# Patient Record
Sex: FEMALE | Race: AMERICAN INDIAN OR ALASKA NATIVE
[De-identification: names, ages, dates, MRNs, and addresses within clinical notes are randomized per-mention and may not be internally consistent; named-entity substitution may affect disease eponyms.]

---

## 2019-09-27 ENCOUNTER — HOSPITAL ENCOUNTER (EMERGENCY)
Dept: HOSPITAL 5 - ED | Age: 29
LOS: 4 days | Discharge: PSYCHIATRIC OR SUBSTANCE USE FACILITY | End: 2019-10-01
Payer: MEDICAID

## 2019-09-27 DIAGNOSIS — F22: ICD-10-CM

## 2019-09-27 DIAGNOSIS — F31.9: ICD-10-CM

## 2019-09-27 DIAGNOSIS — F23: Primary | ICD-10-CM

## 2019-09-27 LAB
BACTERIA #/AREA URNS HPF: (no result) /HPF
BASOPHILS # (AUTO): 0.1 K/MM3 (ref 0–0.1)
BASOPHILS NFR BLD AUTO: 0.9 % (ref 0–1.8)
BENZODIAZEPINES SCREEN,URINE: (no result)
BILIRUB UR QL STRIP: (no result)
BLOOD UR QL VISUAL: (no result)
BUN SERPL-MCNC: 5 MG/DL (ref 7–17)
BUN/CREAT SERPL: 7 %
CALCIUM SERPL-MCNC: 9.3 MG/DL (ref 8.4–10.2)
EOSINOPHIL # BLD AUTO: 0.1 K/MM3 (ref 0–0.4)
EOSINOPHIL NFR BLD AUTO: 0.7 % (ref 0–4.3)
HCT VFR BLD CALC: 36 % (ref 30.3–42.9)
HEMOLYSIS INDEX: 3
HGB BLD-MCNC: 12.1 GM/DL (ref 10.1–14.3)
LYMPHOCYTES # BLD AUTO: 2.6 K/MM3 (ref 1.2–5.4)
LYMPHOCYTES NFR BLD AUTO: 25.3 % (ref 13.4–35)
MCHC RBC AUTO-ENTMCNC: 34 % (ref 30–34)
MCV RBC AUTO: 84 FL (ref 79–97)
METHADONE SCREEN,URINE: (no result)
MONOCYTES # (AUTO): 1.2 K/MM3 (ref 0–0.8)
MONOCYTES % (AUTO): 11.5 % (ref 0–7.3)
MUCOUS THREADS #/AREA URNS HPF: (no result) /HPF
OPIATE SCREEN,URINE: (no result)
PH UR STRIP: 6 [PH] (ref 5–7)
PLATELET # BLD: 312 K/MM3 (ref 140–440)
RBC # BLD AUTO: 4.27 M/MM3 (ref 3.65–5.03)
RBC #/AREA URNS HPF: 5 /HPF (ref 0–6)
UROBILINOGEN UR-MCNC: 2 MG/DL (ref ?–2)
WBC #/AREA URNS HPF: 2 /HPF (ref 0–6)

## 2019-09-27 PROCEDURE — 36415 COLL VENOUS BLD VENIPUNCTURE: CPT

## 2019-09-27 PROCEDURE — 84703 CHORIONIC GONADOTROPIN ASSAY: CPT

## 2019-09-27 PROCEDURE — 80178 ASSAY OF LITHIUM: CPT

## 2019-09-27 PROCEDURE — 80048 BASIC METABOLIC PNL TOTAL CA: CPT

## 2019-09-27 PROCEDURE — G0480 DRUG TEST DEF 1-7 CLASSES: HCPCS

## 2019-09-27 PROCEDURE — 81001 URINALYSIS AUTO W/SCOPE: CPT

## 2019-09-27 PROCEDURE — 85025 COMPLETE CBC W/AUTO DIFF WBC: CPT

## 2019-09-27 PROCEDURE — 80320 DRUG SCREEN QUANTALCOHOLS: CPT

## 2019-09-27 PROCEDURE — 80307 DRUG TEST PRSMV CHEM ANLYZR: CPT

## 2019-09-27 RX ADMIN — LITHIUM CARBONATE SCH MG: 300 CAPSULE, GELATIN COATED ORAL at 22:29

## 2019-09-27 RX ADMIN — LITHIUM CARBONATE SCH MG: 300 CAPSULE, GELATIN COATED ORAL at 14:00

## 2019-09-27 NOTE — EMERGENCY DEPARTMENT REPORT
HPI





<BRANDSUE - Last Filed: 09/30/19 21:53>





- HPI


HPI: 


29-year-old -American female presents to the emergency department via 

[facility] after they were allegedly called out for this patient exhibiting

some bizarre behavior.  Patient appears to have a history of bipolar disorder 

and allegedly has been off of her Risperdal and lithium for about one year after

being pregnant.  The patient is not forthcoming regarding the events that 

occurred prior to arrival and the reasons why she was brought in by EMS.  She 

admits that the person who called EMS is someone that she knows and it sounds 

like she may have come from home.  Her main complaints to me at this time are 

that "I am cold" and "I am tired because I am not getting sleep."  There is some

triage information that also says that she has not slept the past 2-3 days and 

that she needs a break from her kids.  I asked the patient if she has any 

auditory or visual hallucinations and she just responds with "tomb."








<ANABELLA JORDAN S - Last Filed: 10/04/19 08:21>





- General


Chief Complaint: Psych


Time Seen by Provider: 09/27/19 06:03





ED Past Medical Hx





<SUE BRAND - Last Filed: 09/30/19 21:53>





- Past Medical History


Previous Medical History?: Yes


Hx Psychiatric Treatment: Yes (Bipolar)





- Surgical History


Past Surgical History?: No





- Social History


Smoking Status: Unknown if ever smoked


Substance Use Type: None





<ANABELLA JORDAN S - Last Filed: 10/04/19 08:21>





- Medications


Home Medications: 


                                Home Medications











 Medication  Instructions  Recorded  Confirmed  Last Taken  Type


 


No Known Home Medications [No  09/27/19 09/27/19 Unknown History





Reported Home Medications]     














ED Review of Systems


ROS: 


Stated complaint: MH EVAL


Other details as noted in HPI








<SUE BRAND - Last Filed: 09/30/19 21:53>


ROS: 


Stated complaint: MH EVAL


Other details as noted in HPI





Comment: Unobtainable due to pts medical conditions





<ANABELLA JORDAN S - Last Filed: 10/04/19 08:21>





Physical Exam





- Physical Exam


Vital Signs: 


                                   Vital Signs











  09/27/19 09/27/19 09/27/19





  04:09 13:00 19:53


 


Temperature 97.7 F 98.1 F 98.0 F


 


Pulse Rate 69 66 64


 


Respiratory 20 18 18





Rate   


 


Blood Pressure 120/85 122/88 110/65





[Right]   


 


O2 Sat by Pulse 100 97 100





Oximetry   














  09/28/19 09/28/19 09/28/19





  02:29 08:32 16:50


 


Temperature 97.4 F L 97.8 F 97.5 F L


 


Pulse Rate 56 L 68 68


 


Respiratory 16 18 18





Rate   


 


Blood Pressure 111/73 117/85 120/81





[Right]   


 


O2 Sat by Pulse 100 100 100





Oximetry   














  09/28/19 09/29/19 09/29/19





  19:30 01:00 07:00


 


Temperature 97.3 F L 97.8 F 98.0 F


 


Pulse Rate 71 51 L 74


 


Respiratory 18 18 18





Rate   


 


Blood Pressure 115/82 101/63 124/82





[Right]   


 


O2 Sat by Pulse 100 100 100





Oximetry   














  09/29/19 09/29/19 09/30/19





  14:00 20:40 01:00


 


Temperature 98.7 F 97.2 F L 97.8 F


 


Pulse Rate 64 78 86


 


Respiratory 18 18 16





Rate   


 


Blood Pressure 120/70 106/70 99/70





[Right]   


 


O2 Sat by Pulse 99 100 100





Oximetry   














  09/30/19 09/30/19





  08:00 14:47


 


Temperature 97.3 F L 97.8 F


 


Pulse Rate 89 74


 


Respiratory 18 20





Rate  


 


Blood Pressure 115/74 110/74





[Right]  


 


O2 Sat by Pulse 99 100





Oximetry  














<SUE BRAND - Last Filed: 09/30/19 21:53>





- Physical Exam


Vital Signs: 


                                   Vital Signs











  09/27/19





  04:09


 


Temperature 97.7 F


 


Pulse Rate 69


 


Respiratory 20





Rate 


 


Blood Pressure 120/85





[Right] 


 


O2 Sat by Pulse 100





Oximetry 











Physical Exam: 





GENERAL: The patient is well-developed well-nourished.


HENT: Normocephalic.  Atraumatic.    Patient has moist mucous membranes.


EYES: Extraocular motions are intact.  


NECK: Supple. Trachea is midline.


CHEST/LUNGS: Clear to auscultation.  There is no respiratory distress noted.


HEART/CARDIOVASCULAR: Regular.  There is no tachycardia.  There is no murmur.


ABDOMEN: Abdomen is soft, nontender.  Patient has normal bowel sounds.  There is

 no abdominal distention.


SKIN: Skin is warm and dry.


NEURO: The patient is awake, alert, and oriented.  The patient is cooperative.  

The patient has no focal neurologic deficits.  Normal speech.


MUSCULOSKELETAL: There is no tenderness or deformity.   There is no evidence of 

acute injury.





<ANABELLA JORDAN - Last Filed: 10/04/19 08:21>





ED Course


                                   Vital Signs











  09/27/19 09/27/19 09/27/19





  04:09 13:00 19:53


 


Temperature 97.7 F 98.1 F 98.0 F


 


Pulse Rate 69 66 64


 


Respiratory 20 18 18





Rate   


 


Blood Pressure 120/85 122/88 110/65





[Right]   


 


O2 Sat by Pulse 100 97 100





Oximetry   














  09/28/19 09/28/19 09/28/19





  02:29 08:32 16:50


 


Temperature 97.4 F L 97.8 F 97.5 F L


 


Pulse Rate 56 L 68 68


 


Respiratory 16 18 18





Rate   


 


Blood Pressure 111/73 117/85 120/81





[Right]   


 


O2 Sat by Pulse 100 100 100





Oximetry   














  09/28/19 09/29/19 09/29/19





  19:30 01:00 07:00


 


Temperature 97.3 F L 97.8 F 98.0 F


 


Pulse Rate 71 51 L 74


 


Respiratory 18 18 18





Rate   


 


Blood Pressure 115/82 101/63 124/82





[Right]   


 


O2 Sat by Pulse 100 100 100





Oximetry   














  09/29/19 09/29/19 09/30/19





  14:00 20:40 01:00


 


Temperature 98.7 F 97.2 F L 97.8 F


 


Pulse Rate 64 78 86


 


Respiratory 18 18 16





Rate   


 


Blood Pressure 120/70 106/70 99/70





[Right]   


 


O2 Sat by Pulse 99 100 100





Oximetry   














  09/30/19 09/30/19





  08:00 14:47


 


Temperature 97.3 F L 97.8 F


 


Pulse Rate 89 74


 


Respiratory 18 20





Rate  


 


Blood Pressure 115/74 110/74





[Right]  


 


O2 Sat by Pulse 99 100





Oximetry  














- Reevaluation(s)


Reevaluation #1: 





09/30/19 21:53


Patient has been accepted to [facility] at this time for continued mental 

health care





<SUE BRAND - Last Filed: 09/30/19 21:53>


                                   Vital Signs











  09/27/19





  04:09


 


Temperature 97.7 F


 


Pulse Rate 69


 


Respiratory 20





Rate 


 


Blood Pressure 120/85





[Right] 


 


O2 Sat by Pulse 100





Oximetry 














<ANABELLA JORDNA - Last Filed: 10/04/19 08:21>





ED Medical Decision Making





- Lab Data


Result diagrams: 


                                 09/27/19 05:22





                                 09/27/19 05:22





<SUE BRAND - Last Filed: 09/30/19 21:53>





- Lab Data


Result diagrams: 


                                 09/27/19 05:22





                                 09/27/19 05:22





- Medical Decision Making


This patient presents to the emergency department for a mental health 

evaluation.  For me, she is not very forthcoming regarding the prehospital 

history or regarding her current state of mind.  However, the patient was seen 

by the psych , Spencer, and began telling him about her hallucinations and 

what appears to be paranoia and delusions.  For this reason, the patient does 

appear to be having some acute psychosis and meets criteria to be made a 1013.  

The labs have been unremarkable.  Vital signs stable throughout her ED course.  

Patient is medically cleared for psychiatric placement.








- Differential Diagnosis


bipolar disorder, schizophrenia, schizoaffective





<ANABELLA JORDAN - Last Filed: 10/04/19 08:21>


Critical care attestation.: 


If time is entered above; I have spent that time in minutes in the direct care 

of this critically ill patient, excluding procedure time.








<SUE BRAND - Last Filed: 09/30/19 21:53>


Critical Care Time: No


Critical care attestation.: 


If time is entered above; I have spent that time in minutes in the direct care 

of this critically ill patient, excluding procedure time.








<ANABELLA JORDAN - Last Filed: 10/04/19 08:21>





ED Disposition


Is pt being admited?: No


Does the pt Need Aspirin: No





<SUE BRAND - Last Filed: 09/30/19 21:53>


Is pt being admited?: No


Time of Disposition: 14:51





<ANABELLA JORDAN - Last Filed: 10/04/19 08:21>


Clinical Impression: 


 Acute psychosis, Paranoia, Delusions





Disposition: DC/[place]-[facility] PSY HOSP/PSY UNIT


Condition: Stable


Referrals: 


PRIMARY CARE,MD [Primary Care Provider] - 3-5 Days

## 2019-09-27 NOTE — CONSULTATION
History of Present Illness





- Reason for Consult


Consult date: 09/27/19


Reason for consult: Mental Health Evaluation


Requesting physician: ANABELLA JORDAN





- Chief Complaint


Chief complaint: 


"It's about Yahweh"








- History of Present Psychiatric Illness


29 y.o. AA female who presented to the ER for bizarre behavior. Today the 

patient was manic, but cooperative during the assessment. She was hyper 

Gnosticist throughout the interview. She was observed holding a huge bible close 

to herself. She stated that she came to the ER because she was advised by her 

spiritual family to seek help. She believe that women and children are being 

abducted at her apt complex. She stated that she have not been on her psy me

dications (Risperdal/Lithium) for years because she was pregnant. She stated 

that she haven't sleep in several days because "Carlos" said not to sleep. She 

is adamant that "Carlos" is her name as well. She denies SI/HI's and AVH's. She 

denies a poor appetite. She denies recreational drug use, but was positive for 

marijuana. She denies alcohol consumption (etoh).  





At this time, the patient's legal name is unknown.





Medications and Allergies


                                    Allergies











Allergy/AdvReac Type Severity Reaction Status Date / Time


 


No Known Allergies Allergy   Unverified 09/27/19 04:09











                                Home Medications











 Medication  Instructions  Recorded  Confirmed  Last Taken  Type


 


No Known Home Medications [No  09/27/19 09/27/19 Unknown History





Reported Home Medications]     














Past psychiatric history





- Past Medical History


Past Medical History: other (Pregnant x 3)


Past Surgical History: No surgical history





- past Psychiatric treatment and history


psychiatric treatment history: 


Hx of Bipolar DO. Denies a fam psy hx.








- Social History


Social history: lives with family





Mental Status Exam





- Vital signs


                                Last Vital Signs











Temp  97.7 F   09/27/19 04:09


 


Pulse  69   09/27/19 04:09


 


Resp  20   09/27/19 04:09


 


BP  120/85   09/27/19 04:09


 


Pulse Ox  100   09/27/19 04:09














- Exam


Narrative exam: 


MSE:





 Appearance: bed sheet wrapped around the patient's head 


 Behavior: regular eye contact


 Speech: regular rate and tone


 Mood: "okay"


 Affect: congruent to mood 


 Thought Process: circumstantial          


 Thought Content: denies SI/HI's and VH's, delusional, hyper Gnosticist     


 Motor Activity: sitting up in bed


 Cognition: A/O x 3


 Insight: variable 


 Judgment: poor   























Results


Result Diagrams: 


                                 09/27/19 05:22





                                 09/27/19 05:22


                              Abnormal lab results











  09/27/19 09/27/19 09/27/19 Range/Units





  05:22 05:22 05:22 


 


Mono % (Auto)     (0.0-7.3)  %


 


Mono #     (0.0-0.8)  K/mm3


 


BUN    5 L  (7-17)  mg/dL


 


Salicylates  < 0.3 L    (2.8-20.0)  mg/dL


 


Acetaminophen   < 5.0 L   (10.0-30.0)  ug/mL














  09/27/19 Range/Units





  05:22 


 


Mono % (Auto)  11.5 H  (0.0-7.3)  %


 


Mono #  1.2 H  (0.0-0.8)  K/mm3


 


BUN   (7-17)  mg/dL


 


Salicylates   (2.8-20.0)  mg/dL


 


Acetaminophen   (10.0-30.0)  ug/mL








All other labs normal.








Assessment and Plan


Assessment and plan: 


Impression: Bipolar DO with psychosis. Cannabis Use DO. The patient is manic. 

Today the patient was cooperative during the assessment. 





DDx: SCAD, Schizophrenia





Recommendation/Plan: Continue 1013 and start Risperdal 1 mg PO HS for 

psychosis/mood and Lithium 300 mg PO BID for mood. Discussed possible metabolic 

side effects of Risperdal with the patient, she verbalized understanding.  





Dispo: The patient was referred to inpatient psy services.   





Staffed with Dr NEIL Fernández.

## 2019-09-28 RX ADMIN — LITHIUM CARBONATE SCH MG: 300 CAPSULE, GELATIN COATED ORAL at 21:52

## 2019-09-28 RX ADMIN — LITHIUM CARBONATE SCH MG: 300 CAPSULE, GELATIN COATED ORAL at 11:00

## 2019-09-28 NOTE — EMERGENCY DEPARTMENT REPORT
<TEJEDABILL M - Last Filed: 10/02/19 23:46>





ED General Adult HPI





- General


Chief complaint: Psych


Stated complaint: MH EVAL


Time Seen by Provider: 09/27/19 06:03





- Related Data


                                Home Medications











 Medication  Instructions  Recorded  Confirmed  Last Taken


 


No Known Home Medications [No  09/27/19 09/27/19 Unknown





Reported Home Medications]    











                                    Allergies











Allergy/AdvReac Type Severity Reaction Status Date / Time


 


No Known Allergies Allergy   Unverified 09/27/19 04:09














ED Past Medical Hx





- Medications


Home Medications: 


                                Home Medications











 Medication  Instructions  Recorded  Confirmed  Last Taken  Type


 


No Known Home Medications [No  09/27/19 09/27/19 Unknown History





Reported Home Medications]     














ED Medical Decision Making





- Lab Data


Result diagrams: 


                                 09/27/19 05:22





                                 09/27/19 05:22





ED Disposition


Clinical Impression: 


 Paranoia, Delusions





Disposition: DC/[facility] PSY HOSP/PSY UNIT


Condition: Stable


Referrals: 


PRIMARY CARE,MD [Primary Care Provider] - 3-5 Days





<NAPOLEON CAMACHO - Last Filed: 11/23/19 12:34>





ED General Adult HPI





- General


Source: patient, EMS


Mode of arrival: Stretcher


Limitations: No Limitations





- History of Present Illness


Initial comments: 





Chief complaint: 


"It's about Yahweh"








- History of Present Psychiatric Illness


29 y.o. AA female who presented to the ER for bizarre behavior. Today Patient 

shows no significant changes, a bit more calmer still manic, but cooperative 

during the assessment. She is hyper Faith throughout the interview. [employer] 

is her Mosque and she is yet to divulge her real name or significant details 

of existance. She holds the teachings of Carlos in her lap as we talk. She 

stated that she came to the ER because she was advised by her spiritual family 

to seek help. She believe that women and children are being abducted at her apt 

complex. She stated that she have not been on her psy medications 

(Risperdal/Lithium) for year because she was pregnant. She stated that she 

haven't sleep in several days because "Yalutherjustina" said not to sleep.  She denies 

SI/HI's and AVH's. She denies a poor appetite. She denies recreational drug use,

but she was positive for marijuana. She denies alcohol consumption (etoh).  





At this time, the patient's legal name is unknown.





                                    Allergies











Allergy/AdvReac Type Severity Reaction Status Date / Time


 


No Known Allergies Allergy   Unverified 09/27/19 04:09




















- Past Medical History


Past Medical History: other (Pregnant x 3)


Past Surgical History: No surgical history





- past Psychiatric treatment and history


psychiatric treatment history: 


Hx of Bipolar DO. Denies a fam psy hx.








- Social History


Social history: lives with family








- Vital signs


                                Last Vital Signs











Temp  97.7 F   09/27/19 04:09


 


Pulse  69   09/27/19 04:09


 


Resp  20   09/27/19 04:09


 


BP  120/85   09/27/19 04:09


 


Pulse Ox  100   09/27/19 04:09














- Exam


Narrative exam: 


MSE:





 Appearance: bed sheet wrapped around the patient's head 


 Behavior: regular eye contact


 Speech: regular rate and tone


 Mood: "okay"


 Affect: congruent to mood 


 Thought Process: circumstantial          


 Thought Content: denies SI/HI's and VH's, delusional, hyper Faith     


 Motor Activity: sitting up in bed


 Cognition: A/O x 3


 Insight: variable 


 Judgment: poor   























 Results 


Result Diagrams: 


                                 09/27/19 05:22





                                 09/27/19 05:22


                              Abnormal lab results











  09/27/19 09/27/19 09/27/19 Range/Units





  05:22 05:22 05:22 


 


Mono % (Auto)     (0.0-7.3)  %


 


Mono #     (0.0-0.8)  K/mm3


 


BUN    5 L  (7-17)  mg/dL


 


Salicylates  < 0.3 L    (2.8-20.0)  mg/dL


 


Acetaminophen   < 5.0 L   (10.0-30.0)  ug/mL














  09/27/19 Range/Units





  05:22 


 


Mono % (Auto)  11.5 H  (0.0-7.3)  %


 


Mono #  1.2 H  (0.0-0.8)  K/mm3


 


BUN   (7-17)  mg/dL


 


Salicylates   (2.8-20.0)  mg/dL


 


Acetaminophen   (10.0-30.0)  ug/mL








All other labs normal.











Assessment and plan: 


Impression: Bipolar DO with psychosis. Cannabis Use DO. The patient is manic. 

Today the patient was cooperative during the assessment. 





DDx: SCAD, Schizophrenia





Recommendation/Plan: Continue 1013 and start Risperdal 1 mg PO HS for 

psychosis/mood and Lithium 300 mg PO BID for mood. Discussed possible metabolic 

side effects of Risperdal with the patient, she verbalized understanding.  





Dispo: The patient was referred to inpatient psy services





Staffe with Dr NEIL Tejeda. 

















Severity scale (0 -10): 0





ED Review of Systems


ROS: 


Stated complaint: MH EVAL


Other details as noted in HPI





Comment: All other systems reviewed and negative





ED Past Medical Hx





- Past Medical History


Previous Medical History?: Yes


Hx Psychiatric Treatment: Yes (Bipolar)





- Surgical History


Past Surgical History?: No





- Social History


Smoking Status: Unknown if ever smoked


Substance Use Type: None





ED Physical Exam





- General


Limitations: No Limitations


General appearance: alert, in no apparent distress





- Head


Head exam: Present: atraumatic, normocephalic





- Eye


Eye exam: Present: normal appearance





- Neck


Neck exam: Present: normal inspection





- Rectal


Rectal exam: Present: deferred





- Neurological Exam


Neurological exam: Present: alert





- Skin


Skin exam: Present: warm, dry





ED Course


                                   Vital Signs











  09/27/19 09/27/19 09/27/19





  04:09 13:00 19:53


 


Temperature 97.7 F 98.1 F 98.0 F


 


Pulse Rate 69 66 64


 


Respiratory 20 18 18





Rate   


 


Blood Pressure 120/85 122/88 110/65





[Right]   


 


O2 Sat by Pulse 100 97 100





Oximetry   














  09/28/19 09/28/19 09/28/19





  02:29 08:32 16:50


 


Temperature 97.4 F L 97.8 F 97.5 F L


 


Pulse Rate 56 L 68 68


 


Respiratory 16 18 18





Rate   


 


Blood Pressure 111/73 117/85 120/81





[Right]   


 


O2 Sat by Pulse 100 100 100





Oximetry   














  09/28/19 09/29/19 09/29/19





  19:30 01:00 07:00


 


Temperature 97.3 F L 97.8 F 98.0 F


 


Pulse Rate 71 51 L 74


 


Respiratory 18 18 18





Rate   


 


Blood Pressure 115/82 101/63 124/82





[Right]   


 


O2 Sat by Pulse 100 100 100





Oximetry   














  09/29/19 09/29/19 09/30/19





  14:00 20:40 01:00


 


Temperature 98.7 F 97.2 F L 97.8 F


 


Pulse Rate 64 78 86


 


Respiratory 18 18 16





Rate   


 


Blood Pressure 120/70 106/70 99/70





[Right]   


 


O2 Sat by Pulse 99 100 100





Oximetry   














  09/30/19 09/30/19 09/30/19





  08:00 14:47 22:40


 


Temperature 97.3 F L 97.8 F 98.3 F


 


Pulse Rate 89 74 83


 


Respiratory 18 20 18





Rate   


 


Blood Pressure 115/74 110/74 120/75





[Right]   


 


O2 Sat by Pulse 99 100 99





Oximetry   














  10/01/19 10/01/19





  00:00 07:00


 


Temperature 97.9 F 97.5 F L


 


Pulse Rate 64 78


 


Respiratory 18 16





Rate  


 


Blood Pressure 101/64 104/76





[Right]  


 


O2 Sat by Pulse 100 99





Oximetry  














ED Medical Decision Making





- Lab Data


Result diagrams: 


                                 09/27/19 05:22





                                 09/27/19 05:22


Critical Care Time: No (mental health evaluation)


Critical care attestation.: 


If time is entered above; I have spent that time in minutes in the direct care 

of this critically ill patient, excluding procedure time.








ED Disposition


Is pt being admited?: Yes


Does the pt Need Aspirin: No

## 2019-09-29 RX ADMIN — LITHIUM CARBONATE SCH MG: 300 CAPSULE, GELATIN COATED ORAL at 22:15

## 2019-09-29 RX ADMIN — LITHIUM CARBONATE SCH MG: 300 CAPSULE, GELATIN COATED ORAL at 11:00

## 2019-09-29 NOTE — EMERGENCY DEPARTMENT REPORT
ED General Adult HPI





- General


Chief complaint: Psych


Stated complaint: MH EVAL


Time Seen by Provider: 09/29/19 12:04


Source: patient, EMS


Mode of arrival: Stretcher


Limitations: No Limitations





- History of Present Illness


Initial comments: 





"It's about Carlos"








- History of Present Psychiatric Illness


29 y.o. AA female who presented to the ER for bizarre behavior. Today Patient 

shows no significant changes. We tried to obtain collateral information from her

mother Little Sanchez phone [phone], but were unable to make contact . 

Patient wanted us to make contact with her spiritual family Carlos Gonzalez at [phone]. She wrote a letter requesting we discharge her because 

she was better now and taking her meds. She remains hyper Caodaism  She is yet 

to divulge her real name or significant details of existance. She denies SI/HI's

and AVH's. She denies a poor appetite. She denies recreational drug use, but she

was positive for marijuana. She denies alcohol consumption (etoh).  





At this time, the patient's legal name is unknown.





                                    Allergies











Allergy/AdvReac Type Severity Reaction Status Date / Time


 


No Known Allergies Allergy   Unverified 09/27/19 04:09




















- Past Medical History


Past Medical History: other (Pregnant x 3)


Past Surgical History: No surgical history





- past Psychiatric treatment and history


psychiatric treatment history: 


Hx of Bipolar DO. Denies a fam psy hx.








- Social History


Social history: lives with family








- Vital signs


                                Last Vital Signs











Temp  97.7 F   09/27/19 04:09


 


Pulse  69   09/27/19 04:09


 


Resp  20   09/27/19 04:09


 


BP  120/85   09/27/19 04:09


 


Pulse Ox  100   09/27/19 04:09














- Exam


Narrative exam: 


MSE:





 Appearance: bed sheet wrapped around the patient's head 


 Behavior: regular eye contact


 Speech: regular rate and tone


 Mood: mild anxiety


 Affect: congruent to mood 


 Thought Process: circumstantial          


 Thought Content: denies SI/HI's and VH's, delusional, hyper Caodaism     


 Motor Activity: sitting up in bed


 Cognition: A/O x 3


 Insight: variable 


 Judgment: poor   























 Results 


Result Diagrams: 


                                 09/27/19 05:22





                                 09/27/19 05:22


                              Abnormal lab results











  09/27/19 09/27/19 09/27/19 Range/Units





  05:22 05:22 05:22 


 


Mono % (Auto)     (0.0-7.3)  %


 


Mono #     (0.0-0.8)  K/mm3


 


BUN    5 L  (7-17)  mg/dL


 


Salicylates  < 0.3 L    (2.8-20.0)  mg/dL


 


Acetaminophen   < 5.0 L   (10.0-30.0)  ug/mL














  09/27/19 Range/Units





  05:22 


 


Mono % (Auto)  11.5 H  (0.0-7.3)  %


 


Mono #  1.2 H  (0.0-0.8)  K/mm3


 


BUN   (7-17)  mg/dL


 


Salicylates   (2.8-20.0)  mg/dL


 


Acetaminophen   (10.0-30.0)  ug/mL








All other labs normal.











Assessment and plan: 


Impression: Bipolar DO with psychosis. Cannabis Use DO. The patient is manic. 

Today the patient was cooperative during the assessment. 





DDx: SCAD, Schizophrenia





Recommendation/Plan: Continue 1013 and start Risperdal 1 mg PO HS for 

psychosis/mood and Lithium 300 mg PO BID for mood. improving  





Dispo: The patient was referred to inpatient psy services





Staffed with Dr NEIL Fernández. 

















Severity scale (0 -10): 0





- Related Data


                                Home Medications











 Medication  Instructions  Recorded  Confirmed  Last Taken


 


No Known Home Medications [No  09/27/19 09/27/19 Unknown





Reported Home Medications]    











                                    Allergies











Allergy/AdvReac Type Severity Reaction Status Date / Time


 


No Known Allergies Allergy   Unverified 09/27/19 04:09














 ED Review of Systems 


ROS: 


Stated complaint: MH EVAL


Other details as noted in HPI





Comment: All other systems reviewed and negative





 ED Past Medical Hx 





- Past Medical History


Previous Medical History?: Yes


Hx Psychiatric Treatment: Yes (Bipolar)





- Surgical History


Past Surgical History?: No





- Social History


Smoking Status: Unknown if ever smoked


Substance Use Type: None





- Medications


Home Medications: 


                                Home Medications











 Medication  Instructions  Recorded  Confirmed  Last Taken  Type


 


No Known Home Medications [No  09/27/19 09/27/19 Unknown History





Reported Home Medications]     














 ED Physical Exam 





- General


Limitations: No Limitations


General appearance: alert, in no apparent distress





- Head


Head exam: Present: atraumatic, normocephalic





- Eye


Eye exam: Present: normal appearance





- Neck


Neck exam: Present: normal inspection





- Rectal


Rectal exam: Present: deferred





- Neurological Exam


Neurological exam: Present: alert





- Skin


Skin exam: Present: warm, dry





 ED Course 


                                   Vital Signs











  09/27/19 09/27/19 09/27/19





  04:09 13:00 19:53


 


Temperature 97.7 F 98.1 F 98.0 F


 


Pulse Rate 69 66 64


 


Respiratory 20 18 18





Rate   


 


Blood Pressure 120/85 122/88 110/65





[Right]   


 


O2 Sat by Pulse 100 97 100





Oximetry   














  09/28/19 09/28/19 09/28/19





  02:29 08:32 16:50


 


Temperature 97.4 F L 97.8 F 97.5 F L


 


Pulse Rate 56 L 68 68


 


Respiratory 16 18 18





Rate   


 


Blood Pressure 111/73 117/85 120/81





[Right]   


 


O2 Sat by Pulse 100 100 100





Oximetry   














  09/28/19 09/29/19 09/29/19





  19:30 01:00 07:00


 


Temperature 97.3 F L 97.8 F 98.0 F


 


Pulse Rate 71 51 L 74


 


Respiratory 18 18 18





Rate   


 


Blood Pressure 115/82 101/63 124/82





[Right]   


 


O2 Sat by Pulse 100 100 100





Oximetry   














 ED Medical Decision Making 





- Lab Data


Result diagrams: 


                                 09/27/19 05:22





                                 09/27/19 05:22


Critical Care Time: No (mental health evaluation)


Critical care attestation.: 


If time is entered above; I have spent that time in minutes in the direct care 

of this critically ill patient, excluding procedure time.








 ED Disposition 


Clinical Impression: 


 Acute psychosis, Paranoia, Delusions





Condition: Stable


Referrals: 


PRIMARY CARE,MD [Primary Care Provider] - 3-5 Days





Severity scale (0 -10): 0





- Related Data


                                Home Medications











 Medication  Instructions  Recorded  Confirmed  Last Taken


 


No Known Home Medications [No  09/27/19 09/27/19 Unknown





Reported Home Medications]    











                                    Allergies











Allergy/AdvReac Type Severity Reaction Status Date / Time


 


No Known Allergies Allergy   Unverified 09/27/19 04:09














ED Review of Systems


ROS: 


Stated complaint: MH EVAL


Other details as noted in HPI








ED Past Medical Hx





- Past Medical History


Previous Medical History?: Yes


Hx Psychiatric Treatment: Yes (Bipolar)





- Surgical History


Past Surgical History?: No





- Social History


Smoking Status: Unknown if ever smoked


Substance Use Type: None





- Medications


Home Medications: 


                                Home Medications











 Medication  Instructions  Recorded  Confirmed  Last Taken  Type


 


No Known Home Medications [No  09/27/19 09/27/19 Unknown History





Reported Home Medications]     














ED Physical Exam





- General


Limitations: No Limitations


General appearance: alert, in no apparent distress





ED Course


                                   Vital Signs











  09/27/19 09/27/19 09/27/19





  04:09 13:00 19:53


 


Temperature 97.7 F 98.1 F 98.0 F


 


Pulse Rate 69 66 64


 


Respiratory 20 18 18





Rate   


 


Blood Pressure 120/85 122/88 110/65





[Right]   


 


O2 Sat by Pulse 100 97 100





Oximetry   














  09/28/19 09/28/19 09/28/19





  02:29 08:32 16:50


 


Temperature 97.4 F L 97.8 F 97.5 F L


 


Pulse Rate 56 L 68 68


 


Respiratory 16 18 18





Rate   


 


Blood Pressure 111/73 117/85 120/81





[Right]   


 


O2 Sat by Pulse 100 100 100





Oximetry   














  09/28/19 09/29/19 09/29/19





  19:30 01:00 07:00


 


Temperature 97.3 F L 97.8 F 98.0 F


 


Pulse Rate 71 51 L 74


 


Respiratory 18 18 18





Rate   


 


Blood Pressure 115/82 101/63 124/82





[Right]   


 


O2 Sat by Pulse 100 100 100





Oximetry   














  09/29/19 09/29/19 09/30/19





  14:00 20:40 01:00


 


Temperature 98.7 F 97.2 F L 97.8 F


 


Pulse Rate 64 78 86


 


Respiratory 18 18 16





Rate   


 


Blood Pressure 120/70 106/70 99/70





[Right]   


 


O2 Sat by Pulse 99 100 100





Oximetry   














  09/30/19 09/30/19 09/30/19





  08:00 14:47 22:40


 


Temperature 97.3 F L 97.8 F 98.3 F


 


Pulse Rate 89 74 83


 


Respiratory 18 20 18





Rate   


 


Blood Pressure 115/74 110/74 120/75





[Right]   


 


O2 Sat by Pulse 99 100 99





Oximetry   














  10/01/19 10/01/19





  00:00 07:00


 


Temperature 97.9 F 97.5 F L


 


Pulse Rate 64 78


 


Respiratory 18 16





Rate  


 


Blood Pressure 101/64 104/76





[Right]  


 


O2 Sat by Pulse 100 99





Oximetry  














ED Medical Decision Making





- Lab Data


Result diagrams: 


                                 09/27/19 05:22





                                 09/27/19 05:22


Critical Care Time: No (mental health eval)


Critical care attestation.: 


If time is entered above; I have spent that time in minutes in the direct care 

of this critically ill patient, excluding procedure time.








ED Disposition


Clinical Impression: 


 Paranoia, Delusions





Schizo-affective psychosis


Qualifiers:


 Schizoaffective disorder type: unspecified Qualified Code(s): F25.9 - 

Schizoaffective disorder, unspecified





Disposition: Z-07 MED SCREENING EXAM-LEFT


Condition: Stable


Referrals: 


PRIMARY CARE,MD [Primary Care Provider] - 3-5 Days

## 2019-09-30 RX ADMIN — LITHIUM CARBONATE SCH MG: 300 CAPSULE, GELATIN COATED ORAL at 11:31

## 2019-09-30 RX ADMIN — LITHIUM CARBONATE SCH MG: 300 CAPSULE, GELATIN COATED ORAL at 22:21

## 2019-09-30 NOTE — PROGRESS NOTE
Subjective





- Reason for Consult


Consult date: 09/30/19


Reason for consult: Psychiatry Follow-up





- Chief Complaint


Chief complaint: 


"I'm just here"





29 y.o. AA female who presented to the ER for bizarre behavior. Today the 

patient was calm during the assessment. She continue to think women and children

is being abducting at her place of resident when asked. She did state that she 

slept "okay" last night. Prior to my arrival to her room, the patient was seen 

pacing. She is adamant that "Yahweh" will make things better for her. She denies

SI/HI;s and AVH's. No indications of side effects of her medications. 





Mental Status Exam





- Vital signs


                                Last Vital Signs











Temp  97.8 F   09/30/19 01:00


 


Pulse  86   09/30/19 01:00


 


Resp  16   09/30/19 01:00


 


BP  99/70   09/30/19 01:00


 


Pulse Ox  100   09/30/19 01:00














- Exam


Narrative exam: 


MSE:





 Appearance: bed sheet wrapped around the patient's head 


 Behavior: regular eye contact


 Speech: regular rate and tone


 Mood: "okay"


 Affect: congruent to mood 


 Thought Process: circumstantial          


 Thought Content: denies SI/HI's and VH's, delusional, hyper Mu-ism     


 Motor Activity: sitting up in bed


 Cognition: A/O x 3


 Insight: poor 


 Judgment: poor   



































Assessment and Plan


Impression: Bipolar DO with psychosis. Cannabis Use DO. The patient is manic. 

Today the patient was cooperative during the assessment. 





DDx: SCAD, Schizophrenia





Recommendation/Plan: Continue 1013 and Lithium 300 mg PO BID for mood. Increase 

Risperdal to 2 mg PO HS for psychosis/mood. Discussed possible metabolic side 

effects of Risperdal with the patient, she verbalized understanding.  





Dispo: The patient was accepted at [facility] for inpatient psy services.   





Will staff with Dr NEIL Fernández.

## 2019-10-01 VITALS — DIASTOLIC BLOOD PRESSURE: 76 MMHG | SYSTOLIC BLOOD PRESSURE: 104 MMHG

## 2019-10-01 NOTE — PROGRESS NOTE
Subjective





- Reason for Consult


Consult date: 10/01/19


Reason for consult: Psychiatry Follow-up





- Chief Complaint


Chief complaint: 


"I'm leaving soon""





29 y.o. AA female who presented to the ER for bizarre behavior. Today the 

patient was calm and cooperative during the assessment. She is adamant that her 

name is "Carlos" and "Carlos" will make things better in her life. The patient's

legal name is still unknown. She denies SI/HI's and AVH's. She denies any side 

effects from her medication.  





Mental Status Exam





- Vital signs


                                Last Vital Signs











Temp  97.5 F L  10/01/19 07:00


 


Pulse  78   10/01/19 07:00


 


Resp  16   10/01/19 07:00


 


BP  104/76   10/01/19 07:00


 


Pulse Ox  99   10/01/19 07:00














- Exam


Narrative exam: 


MSE:





 Appearance: bed sheet wrapped around the patient's head 


 Behavior: regular eye contact


 Speech: regular rate and tone


 Mood: "okay"


 Affect: congruent to mood 


 Thought Process: circumstantial          


 Thought Content: denies SI/HI's and VH's, delusional    


 Motor Activity: sitting up in bed


 Cognition: A/O x 3


 Insight: variable  


 Judgment: variable    


















































Assessment and Plan


Impression: Bipolar DO with psychosis. Cannabis Use DO. The patient is manic. 

Today the patient was cooperative during the assessment. 





DDx: SCAD, Schizophrenia





Recommendation/Plan: Continue 1013 and Lithium 300 mg PO BID for mood. Increase 

Risperdal to 2 mg PO HS for psychosis/mood. Discussed possible metabolic side 

effects of Risperdal with the patient, she verbalized understanding.  





Dispo: The patient was accepted at [facility] for inpatient psy services.   





Staffed with Dr NEIL Fernández.

## 2022-09-07 ENCOUNTER — HOSPITAL ENCOUNTER (EMERGENCY)
Dept: HOSPITAL 5 - ED | Age: 32
LOS: 1 days | Discharge: STILL ADMITTED OR ADMINISTRATIVE | End: 2022-09-08
Payer: MEDICAID

## 2022-09-07 VITALS — SYSTOLIC BLOOD PRESSURE: 120 MMHG | DIASTOLIC BLOOD PRESSURE: 59 MMHG

## 2022-09-07 DIAGNOSIS — Z79.899: ICD-10-CM

## 2022-09-07 DIAGNOSIS — F31.9: Primary | ICD-10-CM

## 2022-09-07 DIAGNOSIS — R44.1: ICD-10-CM

## 2022-09-07 LAB
ALBUMIN SERPL-MCNC: 5 G/DL (ref 3.9–5)
ALT SERPL-CCNC: 38 UNITS/L (ref 7–56)
BASOPHILS # (AUTO): 0.1 K/MM3 (ref 0–0.1)
BASOPHILS NFR BLD AUTO: 0.6 % (ref 0–1.8)
BUN SERPL-MCNC: 7 MG/DL (ref 7–17)
BUN/CREAT SERPL: 12 %
CALCIUM SERPL-MCNC: 9.4 MG/DL (ref 8.4–10.2)
EOSINOPHIL # BLD AUTO: 0.1 K/MM3 (ref 0–0.4)
EOSINOPHIL NFR BLD AUTO: 1.2 % (ref 0–4.3)
HCT VFR BLD CALC: 39.4 % (ref 30.3–42.9)
HEMOLYSIS INDEX: 1
HGB BLD-MCNC: 12.9 GM/DL (ref 10.1–14.3)
LYMPHOCYTES # BLD AUTO: 3.5 K/MM3 (ref 1.2–5.4)
LYMPHOCYTES NFR BLD AUTO: 30.3 % (ref 13.4–35)
MCHC RBC AUTO-ENTMCNC: 33 % (ref 30–34)
MCV RBC AUTO: 89 FL (ref 79–97)
MONOCYTES # (AUTO): 1.1 K/MM3 (ref 0–0.8)
MONOCYTES % (AUTO): 9.6 % (ref 0–7.3)
MUCOUS THREADS #/AREA URNS HPF: (no result) /HPF
PLATELET # BLD: 342 K/MM3 (ref 140–440)
RBC # BLD AUTO: 4.44 M/MM3 (ref 3.65–5.03)
RBC #/AREA URNS HPF: 2 /HPF (ref 0–6)
WBC #/AREA URNS HPF: < 1 /HPF (ref 0–6)

## 2022-09-07 PROCEDURE — 85025 COMPLETE CBC W/AUTO DIFF WBC: CPT

## 2022-09-07 PROCEDURE — 81001 URINALYSIS AUTO W/SCOPE: CPT

## 2022-09-07 PROCEDURE — 81025 URINE PREGNANCY TEST: CPT

## 2022-09-07 PROCEDURE — 80320 DRUG SCREEN QUANTALCOHOLS: CPT

## 2022-09-07 PROCEDURE — 80307 DRUG TEST PRSMV CHEM ANLYZR: CPT

## 2022-09-07 PROCEDURE — 80053 COMPREHEN METABOLIC PANEL: CPT

## 2022-09-07 PROCEDURE — 36415 COLL VENOUS BLD VENIPUNCTURE: CPT

## 2022-09-07 PROCEDURE — 99285 EMERGENCY DEPT VISIT HI MDM: CPT

## 2022-09-07 PROCEDURE — G0480 DRUG TEST DEF 1-7 CLASSES: HCPCS

## 2022-09-07 NOTE — EMERGENCY DEPARTMENT REPORT
HPI





- General


Chief Complaint: Psych


PUI?: No


Time Seen by Provider: 09/07/22 14:47





- HPI


HPI: 





32-year-old female with a history of bipolar disorder, self reportedly 

noncompliant with medication, brought in for evaluation by mental health.  

Patient states "my sister told me I ought to get checked out but I think I am 

fine."  Patient cannot tell this provider what medication she is on 

psychiatrically.  She denies any physical complaints.  When probed further the 

patient reports she has been "seeing demons around me all the time" but she 

cannot verify how long she has been perceiving seeing them.  She denies any 

auditory visual hallucinations.  Pain currently 0-10





ED Past Medical Hx





- Past Medical History


Previous Medical History?: No


Hx Psychiatric Treatment: Yes (Bipolar)





- Social History


Smoking Status: Unknown if ever smoked


Substance Use Type: None





- Medications


Home Medications: 


                                Home Medications











 Medication  Instructions  Recorded  Confirmed  Last Taken  Type


 


No Known Home Medications [No  09/27/19 09/27/19 Unknown History





Reported Home Medications]     














ED Review of Systems


ROS: 


Stated complaint: MENTAL HEALTH EVAL


Other details as noted in HPI





Comment: All other systems reviewed and negative





Physical Exam





- Physical Exam


Vital Signs: 


                                   Vital Signs











  09/07/22





  13:26


 


Temperature 98.2 F


 


Pulse Rate 78


 


Respiratory 14





Rate 


 


Blood Pressure 120/59





[Left] 


 


O2 Sat by Pulse 97





Oximetry 











General: 





Gen: pt is well appearing, no acute distress


HEENT: Normocephalic atraumatic pupils equally round and reactive to light 

extraocular muscles intact sclera anicteric


Neck: Full range of motion, no midline spinal tenderness palpation, no JVD, no 

carotid bruits, no nuchal rigidity


CVS: S1-S2 regular rate and rhythm with no gallops rubs or murmurs, chest wall 

nontender


Pulmonary: Clear to auscultation bilaterally, no wheezes rales or rhonchi


Abdomen: Soft nondistended nontender no guarding or rebound tenderness, no 

palpable deformities or step-offs, normal active bowel sounds, no 

hepatosplenomegaly, no pulsatile masses


: Deferred


Extremities: No cyanosis no clubbing no edema, intact distal peripheral pulses,


Integumentary: Skin normal, no petechia no purpura no abscess no lacerations no 

evidence of trauma no evidence of infection


Neuro: Patient is awake alert and oriented to person place time situation, 

mentating well, cranial nerves II through XII intact, no focal neurodeficits, 

sensation grossly tact


Psych: Calm cooperative, flat affect, patient appears intermittently slow to 

respond





ED Course


                                   Vital Signs











  09/07/22





  13:26


 


Temperature 98.2 F


 


Pulse Rate 78


 


Respiratory 14





Rate 


 


Blood Pressure 120/59





[Left] 


 


O2 Sat by Pulse 97





Oximetry 














ED Medical Decision Making





- Lab Data


Result diagrams: 


                                 09/07/22 16:11





                                 09/07/22 16:11





- Medical Decision Making





32-year-old female with history of bipolar disorder, noncompliant with 

medication, brought in for mental health evaluation.  Vital signs stable.  Labs 

grossly unremarkable.  As the timing of the dictation of this note, urine hCG 

remains pending.  Patient is otherwise deemed medically cleared.  1013 form sign

ed.  Mental health consult placed.





Patient will await formal evaluation by mental health and subsequent disposition

 by that service.


Critical care attestation.: 


If time is entered above; I have spent that time in minutes in the direct care 

of this critically ill patient, excluding procedure time.








ED Disposition


Clinical Impression: 


 Bipolar disorder, Visual hallucinations





Disposition: 30 STILL A PATIENT


Is pt being admited?: No


Does the pt Need Aspirin: No


Condition: Stable


Referrals: 


PRIMARY CARE,MD [Primary Care Provider] - 3-5 Days

## 2025-04-24 ENCOUNTER — OFFICE VISIT (OUTPATIENT)
Dept: URBAN - METROPOLITAN AREA CLINIC 109 | Facility: CLINIC | Age: 35
End: 2025-04-24

## 2025-05-13 ENCOUNTER — OFFICE VISIT (OUTPATIENT)
Dept: URBAN - METROPOLITAN AREA CLINIC 118 | Facility: CLINIC | Age: 35
End: 2025-05-13

## 2025-06-10 ENCOUNTER — OFFICE VISIT (OUTPATIENT)
Dept: URBAN - METROPOLITAN AREA CLINIC 118 | Facility: CLINIC | Age: 35
End: 2025-06-10

## 2025-07-11 ENCOUNTER — DASHBOARD ENCOUNTERS (OUTPATIENT)
Age: 35
End: 2025-07-11

## 2025-07-11 ENCOUNTER — LAB OUTSIDE AN ENCOUNTER (OUTPATIENT)
Dept: URBAN - METROPOLITAN AREA CLINIC 118 | Facility: CLINIC | Age: 35
End: 2025-07-11

## 2025-07-11 ENCOUNTER — OFFICE VISIT (OUTPATIENT)
Dept: URBAN - METROPOLITAN AREA CLINIC 118 | Facility: CLINIC | Age: 35
End: 2025-07-11
Payer: COMMERCIAL

## 2025-07-11 DIAGNOSIS — K76.9 HEPATIC LESION: ICD-10-CM

## 2025-07-11 DIAGNOSIS — R74.01 TRANSAMINITIS: ICD-10-CM

## 2025-07-11 PROBLEM — 300331000: Status: ACTIVE | Noted: 2025-07-11

## 2025-07-11 PROCEDURE — 99204 OFFICE O/P NEW MOD 45 MIN: CPT | Performed by: INTERNAL MEDICINE

## 2025-07-11 RX ORDER — EZETIMIBE 10 MG/1
TABLET ORAL
Qty: 30 TABLET | Status: ACTIVE | COMMUNITY

## 2025-07-11 RX ORDER — ATORVASTATIN CALCIUM 80 MG/1
TAKE ONE TABLET BY MOUTH EVERY NIGHT TABLET, FILM COATED ORAL
Qty: 90 UNSPECIFIED | Refills: 1 | Status: ACTIVE | COMMUNITY

## 2025-07-11 RX ORDER — LITHIUM CARBONATE 300 MG/1
TAKE ONE TABLET BY MOUTH TWICE A DAY TABLET ORAL
Qty: 180 UNSPECIFIED | Refills: 0 | Status: ACTIVE | COMMUNITY

## 2025-07-11 RX ORDER — RISPERIDONE 3 MG/1
TAKE ONE TABLET BY MOUTH ONE TIME DAILY AT NIGHT TABLET ORAL
Qty: 90 UNSPECIFIED | Refills: 0 | Status: ACTIVE | COMMUNITY

## 2025-07-11 NOTE — HPI-TODAY'S VISIT:
Patient is a 35 year old female with PMH of HLD, obesity, pre-diabetes referred for a liver lesion. A copy of this report will be sent to the referring provider. Liver lesion was incidental finding on CT angio that cardiology ordered, "Indeterminate hyperdense central liver lesion. This can be a hemangioma however MRI liver protocol is recommended for further evaluation." Patient is asymptomatic.  Most recent liver labs (5/29/2025) with slightly elevated ALT 31. Per review of records in Epic, ALT has been stable at 31-32 for one year.  She has been on Atorvastatin for the past year.  Denies alcohol, tobacco, or drug use. No known hepatitis exposure. yes

## 2025-07-11 NOTE — PHYSICAL EXAM EYES:
Conjunctivae and eyelids appear normal, Sclerae : White without injection, no icterus Render Number Of Lesions Treated: no Acne Type: Comedonal Lesions Detail Level: Detailed Post-Care Instructions: I reviewed with the patient in detail post-care instructions. Patient is to keep the treatment areas dry overnight, and then apply bacitracin twice daily until healed. Patient may apply hydrogen peroxide soaks to remove any crusting. Extraction Method: 11 blade and comedo extractor Prep Text (Optional): Prior to removal the treatment areas were prepped in the usual fashion. Consent was obtained and risks were reviewed including but not limited to scarring, infection, bleeding, scabbing, incomplete removal, and allergy to anesthesia.

## 2025-07-14 LAB
ABSOLUTE BASOPHILS: 54
ABSOLUTE EOSINOPHILS: 100
ABSOLUTE LYMPHOCYTES: 2364
ABSOLUTE MONOCYTES: 470
ABSOLUTE NEUTROPHILS: 4712
ALBUMIN/GLOBULIN RATIO: 1.4
ALBUMIN: 4.2
ALKALINE PHOSPHATASE: 48
ALT (SGPT): 22
AST (SGOT): 17
BASOPHILS: 0.7
BILIRUBIN, DIRECT: 0.1
BILIRUBIN, INDIRECT: 0.5
BILIRUBIN, TOTAL: 0.6
EOSINOPHILS: 1.3
GLOBULIN: 2.9
HBSAG SCREEN: (no result)
HEMATOCRIT: 40.6
HEMOGLOBIN: 13.4
HEP A AB, IGM: (no result)
HEP B CORE AB, IGM: (no result)
HEPATITIS C ANTIBODY: (no result)
LYMPHOCYTES: 30.7
MCH: 29.6
MCHC: 33
MCV: 89.8
MONOCYTES: 6.1
MPV: 11.2
NEUTROPHILS: 61.2
PLATELET COUNT: 214
PROTEIN, TOTAL: 7.1
RDW: 13.2
RED BLOOD CELL COUNT: 4.52
WHITE BLOOD CELL COUNT: 7.7